# Patient Record
Sex: MALE | Race: AMERICAN INDIAN OR ALASKA NATIVE
[De-identification: names, ages, dates, MRNs, and addresses within clinical notes are randomized per-mention and may not be internally consistent; named-entity substitution may affect disease eponyms.]

---

## 2017-06-26 ENCOUNTER — HOSPITAL ENCOUNTER (EMERGENCY)
Dept: HOSPITAL 74 - JER | Age: 47
Discharge: HOME | End: 2017-06-26
Payer: COMMERCIAL

## 2017-06-26 VITALS — SYSTOLIC BLOOD PRESSURE: 127 MMHG | DIASTOLIC BLOOD PRESSURE: 73 MMHG | HEART RATE: 76 BPM | TEMPERATURE: 98.2 F

## 2017-06-26 VITALS — BODY MASS INDEX: 27.3 KG/M2
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
## 2017-06-26 DIAGNOSIS — M54.5: Primary | ICD-10-CM
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
## 2017-06-26 LAB
APPEARANCE UR: CLEAR
BILIRUB UR STRIP.AUTO-MCNC: NEGATIVE MG/DL
COLOR UR: YELLOW
KETONES UR QL STRIP: (no result)
LEUKOCYTE ESTERASE UR QL STRIP.AUTO: (no result)
MUCOUS THREADS URNS QL MICRO: (no result)
NITRITE UR QL STRIP: NEGATIVE
PH UR: 5 [PH] (ref 5–8)
PROT UR QL STRIP: NEGATIVE
PROT UR QL STRIP: NEGATIVE
RBC # BLD AUTO: 6 /HPF (ref 0–3)
RBC # UR STRIP: NEGATIVE /UL
SP GR UR: 1.02 (ref 1–1.02)
UROBILINOGEN UR STRIP-MCNC: NEGATIVE E.U./DL (ref 0.2–1)
WBC # UR AUTO: 3 /HPF (ref 3–5)

## 2017-06-26 PROCEDURE — 3E0233Z INTRODUCTION OF ANTI-INFLAMMATORY INTO MUSCLE, PERCUTANEOUS APPROACH: ICD-10-PCS | Performed by: EMERGENCY MEDICINE

## 2017-06-26 NOTE — PDOC
History of Present Illness





- General


History Source: Patient


Exam Limitations: No Limitations





- History of Present Illness


Initial Comments: 


06/26/17 14:49


The patient is a 46-year-old man with a significant past surgical history of 

hernia repair who presents to the emergency department for constant non-

radiating left back pain. The patient reports lifting an approximately 10 lb 

box that might've injured the left side of his back. Patient is unable to 

describe the nature of his pain but notes his pain is exacerbating to a 9/10 in 

severity with musculoskelatal manuvers. He took Aleve at home at approximately 

09:00 AM that did not provide any relief. Patient denies any numbness, tingling 

or weakness to his extremities. No neck pain. No unsteady gait. No bowel/

bladder incontinence. No chest pain, cough, shortness of breath, lightheadedness

, dizziness, nausea, vomiting, diarrhea. No urinary complaints. 





He denies chest pain, cough, shortness of breath, headache


He denies nausea, vomiting, diarrhea


He denies changes in bowel habits, dysuria, hematuria, urinary frequency/urgency

, testicular pain or penile discharge.





Allergies: No Known Drug Allergies


Past Surgical History: Hernia Repair.


Social History: Current everyday cigarette smoker. No EtOH and recreational 

drug use.


Primary Care Physician: Dr. Ruth Palencia








<Sharla Cavanaugh - Last Filed: 06/26/17 16:16>





<Prabha South - Last Filed: 06/26/17 16:38>





<Rhea Calzada - Last Filed: 06/26/17 17:14>





- General


Chief Complaint: Pain, Acute


Stated Complaint: LT SIDE/BACK PAIN (KIDNEY)


Time Seen by Provider: 06/26/17 14:49





Past History





<Sharla Cavanaugh - Last Filed: 06/26/17 16:16>





- Surgical History


Abdominal Surgery:  (HERNIA REPAIR)





- Immunization History


Td Vaccination: Yes


Immunization Up to Date: Yes





- Psycho/Social/Smoking Cessation Hx


Anxiety: No


Suicidal Ideation: No


Smoking Status: Yes


Smoking History: Current every day smoker


Years of Tobacco Use: 14


Have you smoked in the past 12 months: Yes


Number of Cigarettes Smoked Daily: 10


If you are a former smoker, when did you quit?: 3 DAYS AGO


Cigars Per Day: 0


Information on smoking cessation initiated: No


'Breaking Loose' booklet given: 07/13/14


Hx Alcohol Use: No


Drug/Substance Use Hx: No


Substance Use Type: None





<Prabha South - Last Filed: 06/26/17 16:38>





<ZhengRheabam Min - Last Filed: 06/26/17 17:14>





- Past Medical History


Allergies/Adverse Reactions: 


 Allergies











Allergy/AdvReac Type Severity Reaction Status Date / Time


 


No Known Allergies Allergy   Verified 06/26/17 11:41











Home Medications: 


Ambulatory Orders





NK [No Known Home Medication]  06/26/17 











**Review of Systems





- Review of Systems


Able to Perform ROS?: Yes


Comments:: 


06/26/17 14:49


GENERAL/CONSTITUTIONAL: No fever or chills. No weakness.


HEAD, EYES, EARS, NOSE AND THROAT: No change in vision. No ear pain or 

discharge. No sore throat.


CARDIOVASCULAR: No chest pain or shortness of breath.


RESPIRATORY: No cough, wheezing, or hemoptysis.


GASTROINTESTINAL: No nausea, vomiting, diarrhea or constipation.


GENITOURINARY: No dysuria, frequency, or change in urination.


MUSCULOSKELETAL: No joint or muscle swelling or pain. No neck or back pain.


SKIN: No rash


NEUROLOGIC: No headache, vertigo, loss of consciousness, or change in strength/

sensation.


ENDOCRINE: No increased thirst. No abnormal weight change.


HEMATOLOGIC/LYMPHATIC: No anemia, easy bleeding, or history of blood clots.


ALLERGIC/IMMUNOLOGIC: No hives or skin allergy.








<Sharla Cavanaugh - Last Filed: 06/26/17 16:16>





*Physical Exam





- Vital Signs


 Last Vital Signs











Temp Pulse Resp BP Pulse Ox


 


 98 F   77   18   116/75   99 


 


 06/26/17 11:38  06/26/17 11:38  06/26/17 11:38  06/26/17 11:38  06/26/17 11:38














- Physical Exam


Comments: 


06/26/17 14:49


GENERAL: Awake, alert, and fully oriented, in no acute distress 


HEAD: No signs of trauma


EYES: PERRLA, EOMI, sclera anicteric, conjunctiva clear


ENT: Auricles normal inspection, hearing grossly normal, nares patent, 

oropharynx clear without exudates. Moist mucosa


NECK: Normal ROM, supple, no lymphadenopathy, JVD, or masses


LUNGS: Breath sounds equal, clear to auscultation bilaterally.  No wheezes, and 

no crackles


HEART: Regular rate and rhythm, normal S1 and S2, no murmurs, rubs or gallops


ABDOMEN: Soft, nontender, normoactive bowel sounds.  No guarding, no rebound.  

No masses


EXTREMITIES: Normal range of motion, no edema.  No clubbing or cyanosis. No 

cords, erythema, or tenderness


BACK: +Right paraspinal tenderness. 


NEUROLOGICAL: Cranial nerves II through XII grossly intact.  Normal speech.








<Sharla Cavanaugh - Last Filed: 06/26/17 16:16>





- Vital Signs


 Last Vital Signs











Temp Pulse Resp BP Pulse Ox


 


 98 F   77   18   116/75   99 


 


 06/26/17 11:38  06/26/17 11:38  06/26/17 11:38  06/26/17 11:38  06/26/17 11:38














<Prabha South - Last Filed: 06/26/17 16:38>





- Vital Signs


 Last Vital Signs











Temp Pulse Resp BP Pulse Ox


 


 98.2 F   76   17   127/73   100 


 


 06/26/17 16:20  06/26/17 16:20  06/26/17 16:20  06/26/17 16:20  06/26/17 16:20














<Rhea Calzada - Last Filed: 06/26/17 17:14>





ED Treatment Course





- ADDITIONAL ORDERS


Additional order review: 


 Laboratory  Results











  06/26/17





  15:00


 


Urine Color  Yellow


 


Urine Appearance  Clear


 


Urine pH  5.0


 


Urine Protein  Negative


 


Urine Glucose (UA)  Negative


 


Urine Ketones  Trace H


 


Urine Blood  Negative


 


Urine Nitrite  Negative


 


Urine Bilirubin  Negative


 


Urine Urobilinogen  Negative


 


Ur Leukocyte Esterase  Trace H


 


Urine RBC  6


 


Urine WBC  3


 


Ur Epithelial Cells  Rare


 


Urine Mucus  Many














- Medications


Given in the ED: 


ED Medications














Discontinued Medications














Generic Name Dose Route Start Last Admin





  Trade Name Freq  PRN Reason Stop Dose Admin


 


Ketorolac Tromethamine  60 mg 06/26/17 15:29 06/26/17 15:45





  Toradol Injection -  IM 06/26/17 15:30  60 mg





  ONCE ONE   Administration














<Sharla Cavanaugh - Last Filed: 06/26/17 16:16>





- ADDITIONAL ORDERS


Additional order review: 


 Laboratory  Results











  06/26/17





  15:00


 


Urine Color  Yellow


 


Urine Appearance  Clear


 


Urine pH  5.0


 


Urine Protein  Negative


 


Urine Glucose (UA)  Negative


 


Urine Ketones  Trace H


 


Urine Blood  Negative


 


Urine Nitrite  Negative


 


Urine Bilirubin  Negative


 


Urine Urobilinogen  Negative


 


Ur Leukocyte Esterase  Trace H


 


Urine RBC  6


 


Urine WBC  3


 


Ur Epithelial Cells  Rare


 


Urine Mucus  Many














- Medications


Given in the ED: 


ED Medications














Discontinued Medications














Generic Name Dose Route Start Last Admin





  Trade Name Yobani  PRN Reason Stop Dose Admin


 


Ketorolac Tromethamine  60 mg 06/26/17 15:29 06/26/17 15:45





  Toradol Injection -  IM 06/26/17 15:30  60 mg





  ONCE ONE   Administration














<Rhea Calzada - Last Filed: 06/26/17 17:14>





Medical Decision Making





- Medical Decision Making





06/26/17 16:38


Pt presents to the ED complaining of the acute onset of left sided back pain 

that began while lifting a heavy box.  No urinary or neurologic complaints.  No 

trauma or fever.  Pain is made worse with twisting movements of the torso.  

Pain seems most consistent with muscular pain, but UA shows microscopic 

hematuria.  Will check CT abdomen pelvis to rule out renal stone. 





<Parbha South - Last Filed: 06/26/17 16:38>





*DC/Admit/Observation/Transfer





- Attestations


Scribe Attestion: 





06/26/17 15:57





Documentation prepared by Sharla Cavanaugh, acting as medical scribe for 

Prabha South MD, MD/DO.





<Sharla Cavanaugh - Last Filed: 06/26/17 16:16>





<Prabha South - Last Filed: 06/26/17 16:38>





<Rhea Calzada - Last Filed: 06/26/17 17:14>


Diagnosis at time of Disposition: 


Back pain


Qualifiers:


 Back pain location: low back pain Chronicity: acute Back pain laterality: 

right Sciatica presence: without sciatica Qualified Code(s): M54.5 - Low back 

pain





- Discharge Dispostion


Disposition: HOME


Condition at time of disposition: Stable





- Referrals


Referrals: 


Ruth Palencia MD [Primary Care Provider] - 





- Patient Instructions


Printed Discharge Instructions:  DI for Low Back Pain


Additional Instructions: 


please followup with your physician if your symptoms persist

## 2017-08-11 ENCOUNTER — HOSPITAL ENCOUNTER (EMERGENCY)
Dept: HOSPITAL 74 - FER | Age: 47
Discharge: HOME | End: 2017-08-11
Payer: COMMERCIAL

## 2017-08-11 VITALS — DIASTOLIC BLOOD PRESSURE: 79 MMHG | HEART RATE: 70 BPM | SYSTOLIC BLOOD PRESSURE: 125 MMHG | TEMPERATURE: 98 F

## 2017-08-11 VITALS — BODY MASS INDEX: 26.6 KG/M2

## 2017-08-11 DIAGNOSIS — F17.203: Primary | ICD-10-CM

## 2017-08-11 LAB
ALBUMIN SERPL-MCNC: 4.1 G/DL (ref 3.5–5)
ALP SERPL-CCNC: 41 U/L (ref 32–92)
ALT SERPL-CCNC: 23 U/L (ref 10–40)
ANION GAP SERPL CALC-SCNC: 7 MMOL/L (ref 8–16)
AST SERPL-CCNC: 26 U/L (ref 10–42)
BASOPHILS # BLD: 1 % (ref 0–2)
BILIRUB SERPL-MCNC: 0.3 MG/DL (ref 0.2–1)
CALCIUM SERPL-MCNC: 8.9 MG/DL (ref 8.4–10.2)
CK SERPL-CCNC: 163 IU/L (ref 39–308)
CO2 SERPL-SCNC: 26 MMOL/L (ref 22–28)
CREAT SERPL-MCNC: 0.8 MG/DL (ref 0.6–1.3)
DEPRECATED RDW RBC AUTO: 11.7 % (ref 11.9–15.9)
EOSINOPHIL # BLD: 2.3 % (ref 0–4.5)
GLUCOSE SERPL-MCNC: 93 MG/DL (ref 74–106)
MCH RBC QN AUTO: 31.1 PG (ref 25.7–33.7)
MCHC RBC AUTO-ENTMCNC: 36.2 G/DL (ref 32–35.9)
MCV RBC: 85.7 FL (ref 80–96)
NEUTROPHILS # BLD: 50.8 % (ref 42.8–82.8)
PLATELET # BLD AUTO: 122 K/MM3 (ref 134–434)
PMV BLD: 11.4 FL (ref 7.5–11.1)
PROT SERPL-MCNC: 6.6 G/DL (ref 6.4–8.3)
TROPONIN I SERPL-MCNC: < 0.03 NG/ML (ref 0.03–0.5)
WBC # BLD AUTO: 8.1 K/MM3 (ref 4–10.8)

## 2017-08-11 PROCEDURE — 3E023GC INTRODUCTION OF OTHER THERAPEUTIC SUBSTANCE INTO MUSCLE, PERCUTANEOUS APPROACH: ICD-10-PCS

## 2017-08-11 NOTE — EKG
Test Reason : 

Blood Pressure : ***/*** mmHG

Vent. Rate : 061 BPM     Atrial Rate : 061 BPM

   P-R Int : 170 ms          QRS Dur : 102 ms

    QT Int : 410 ms       P-R-T Axes : 046 077 011 degrees

   QTc Int : 412 ms

 

SINUS RHYTHM

WHEN COMPARED WITH ECG OF 27-JUL-2016 09:05,

NO SIGNIFICANT CHANGE WAS FOUND

Confirmed by BROWN MEDINA MD ([id number]) on 8/11/2017 1:20:05 PM

 

Referred By:  KINZA           Confirmed By:BROWN MEDINA MD

## 2017-08-11 NOTE — PDOC
History of Present Illness





- General


Chief Complaint: Chest Pain


Stated Complaint: CHEST,ABD PAIN


Time Seen by Provider: 08/11/17 12:33





- History of Present Illness


Initial Comments: 





08/11/17 14:01


Chief complaint: Chest and abdominal pain





History of present illness: Patient states that he quit smoking one week ago 

after smoking cigarettes 1-2 packs per day for 20 years. A few days thereafter 

he began to feel "heartburn" in his epigastrium radiating to his anterior 

chest. This is accompanied by intermittent nausea, anorexia, anxiety, 

sleeplessness, and tingling in his hands and feet.





Review of systems: Denies diaphoresis, lightheadedness, shortness of breath, 

visual or focal neurologic symptoms, unsteadiness of gait. Remainder systems 

reviewed and found to be negative





Past medical history: Patient is a healthy male, no significant past medical or 

surgical problems, no home medications.





Social history: Quit smoking at the urging of his small daughter. Denies 

alcohol or other drugs. Fully active and without disability





Family history: Reviewed and noncontributory including early coronary artery 

disease, metabolic disease including diabetes, and cancer





Physical exam: Alert and oriented 3, well-developed well-nourished, no acute 

distress, cheerful and cooperative


Afebrile, vital signs normal


PERRLA, fundi benign, ENT clear


Neck supple without bruit mass or nodes


Chest clear with full breath sounds throughout


CV S1 and S2 normal without murmur rub or gallop pulses full and symmetric no 

JVD or edema.


No rib cage or chest wall deformity or point tenderness


Abdomen soft nontender without mass or organomegaly. Bowel sounds normal.


Neurological C2 to 12 intact. No focal sensory or motor deficits. Gait stable 

and unimpaired


Extremities no CCE


Skin clear, no rash, adequate turgor and wet mucous membranes





Impression: Most likely withdrawal from nicotine with multiple somatic 

symptoms. No suggestion of neurologic or cardiac disease on exam or from the 

history





Plan: EKG and enzymes, CBC and chemistries, Protonix and Zofran, further 

medical evaluation depending on results





Past History





- Past Medical History


Allergies/Adverse Reactions: 


 Allergies











Allergy/AdvReac Type Severity Reaction Status Date / Time


 


No Known Allergies Allergy   Verified 08/11/17 12:09











Home Medications: 


Ambulatory Orders





Hydroxyzine Pamoate [Vistaril -] 25 mg PO TID #20 capsule 08/11/17 


Pantoprazole Sodium [Protonix -] 40 mg PO DAILY #10 tablet.ec 08/11/17 








Other medical history: LOWER BACK AND NECK PAIN





- Surgical History


Abdominal Surgery:  (HERNIA REPAIR)





- Immunization History


Td Vaccination: Yes


Immunization Up to Date: Yes





- Psycho/Social/Smoking Cessation Hx


Anxiety: No


Suicidal Ideation: No


Smoking Status: Yes


Smoking History: Former smoker


Years of Tobacco Use: 14


Have you smoked in the past 12 months: Yes


Number of Cigarettes Smoked Daily: 10


If you are a former smoker, when did you quit?: AUG 5TH 2017


Cigars Per Day: 0


Information on smoking cessation initiated: Yes


'Breaking Loose' booklet given: 08/11/17


Hx Alcohol Use: No


Drug/Substance Use Hx: No


Substance Use Type: None





*Physical Exam





- Vital Signs


 Last Vital Signs











Temp Pulse Resp BP Pulse Ox


 


 98 F   70   20   125/79   100 


 


 08/11/17 12:08  08/11/17 12:08  08/11/17 12:08  08/11/17 12:08  08/11/17 12:08














ED Treatment Course





- LABORATORY


CBC & Chemistry Diagram: 


 08/11/17 13:00





 08/11/17 13:00





- ADDITIONAL ORDERS


Additional order review: 


 Laboratory  Results











  08/11/17





  13:00


 


Sodium  136


 


Potassium  3.5


 


Chloride  103


 


Carbon Dioxide  26


 


Anion Gap  7 L


 


BUN  13


 


Creatinine  0.8


 


Creat Clearance w eGFR  > 60


 


Random Glucose  93


 


Calcium  8.9


 


Total Bilirubin  0.3


 


AST  26


 


ALT  23


 


Alkaline Phosphatase  41


 


Creatine Kinase  163


 


Creatine Kinase Index  1.7


 


CK-MB (CK-2)  2.9


 


Troponin I  < 0.03 L


 


Total Protein  6.6


 


Albumin  4.1








 











  08/11/17





  13:00


 


RBC  4.46


 


MCV  85.7


 


MCHC  36.2 H


 


RDW  11.7 L


 


MPV  11.4 H


 


Neutrophils %  50.8


 


Lymphocytes %  39.1


 


Monocytes %  6.8


 


Eosinophils %  2.3


 


Basophils %  1.0














- Medications


Given in the ED: 


ED Medications














Discontinued Medications














Generic Name Dose Route Start Last Admin





  Trade Name Freq  PRN Reason Stop Dose Admin


 


Pantoprazole Sodium 40 mg/  100 mls @ 200 mls/hr 08/11/17 12:57 08/11/17 13:03





  Sodium Chloride  IVPB 08/11/17 13:26  200 mls/hr





  ONCE ONE   Administration


 


Ondansetron HCl  4 mg 08/11/17 13:00 08/11/17 13:03





  Zofran Injection  IVPB 08/11/17 13:01  4 mg





  ONCE ONE   Administration














Medical Decision Making





- Medical Decision Making


08/11/17 12:46


EKG shows normal sinus rhythm rate 61/m. Normal axes and intervals. There is an 

inverted T-wave in lead 3 and the suggestion of flattening of the ST segment in 

aVF. These are the only abnormalities. Old EKGs were obtained from 2014 and 

2016. Similar changes were present, there being no interval change.





08/11/17 12:50








08/11/17 13:47


Laboratories reviewed: No significant abnormalities in the CBC and chemistries. 

Troponin is negative.





Patient symptoms are most likely due to abrupt cessation of smoking after 

smoking 1-2 packs per day for 20 years. He is already 1 week without cigarettes 

and is determined to continue his abstinence. Short course of Protonix and 

anxiolytic until body acclimated to the withdrawal of nicotine. Instructed to 

follow-up with his primary physician or return to the ER if his symptoms are 

worse. Fully ambulatory and in no significant pain or other distress upon 

discharge with friend to follow-up as directed





08/11/17 14:06








*DC/Admit/Observation/Transfer


Diagnosis at time of Disposition: 


 Nicotine withdrawal





- Discharge Dispostion


Disposition: HOME


Condition at time of disposition: Improved


Admit: No





- Prescriptions


Prescriptions: 


Pantoprazole Sodium [Protonix -] 40 mg PO DAILY #10 tablet.ec


Hydroxyzine Pamoate [Vistaril -] 25 mg PO TID #20 capsule





- Patient Instructions


Printed Discharge Instructions:  Smoking Cessation, Smoking Cessation 

Associated with Decreases Risk of Complications After Speedy





- Post Discharge Activity


Work/School Note:  Back to Work

## 2018-02-28 ENCOUNTER — HOSPITAL ENCOUNTER (EMERGENCY)
Dept: HOSPITAL 74 - JER | Age: 48
LOS: 1 days | Discharge: HOME | End: 2018-03-01
Payer: COMMERCIAL

## 2018-02-28 VITALS — DIASTOLIC BLOOD PRESSURE: 82 MMHG | SYSTOLIC BLOOD PRESSURE: 117 MMHG | TEMPERATURE: 97.9 F | HEART RATE: 71 BPM

## 2018-02-28 VITALS — BODY MASS INDEX: 26.9 KG/M2

## 2018-02-28 DIAGNOSIS — M79.602: ICD-10-CM

## 2018-02-28 DIAGNOSIS — M79.1: Primary | ICD-10-CM

## 2018-03-01 LAB
ALBUMIN SERPL-MCNC: 3.6 G/DL (ref 3.4–5)
ALP SERPL-CCNC: 49 U/L (ref 45–117)
ALT SERPL-CCNC: 18 U/L (ref 12–78)
ANION GAP SERPL CALC-SCNC: 12 MMOL/L (ref 8–16)
AST SERPL-CCNC: 17 U/L (ref 15–37)
BASOPHILS # BLD: 0.5 % (ref 0–2)
BILIRUB SERPL-MCNC: 0.3 MG/DL (ref 0.2–1)
BUN SERPL-MCNC: 20 MG/DL (ref 7–18)
CALCIUM SERPL-MCNC: 8.9 MG/DL (ref 8.5–10.1)
CHLORIDE SERPL-SCNC: 103 MMOL/L (ref 98–107)
CO2 SERPL-SCNC: 24 MMOL/L (ref 21–32)
CREAT SERPL-MCNC: 0.9 MG/DL (ref 0.7–1.3)
DEPRECATED RDW RBC AUTO: 12.9 % (ref 11.9–15.9)
EOSINOPHIL # BLD: 2.8 % (ref 0–4.5)
GLUCOSE SERPL-MCNC: 88 MG/DL (ref 74–106)
HCT VFR BLD CALC: 40.1 % (ref 35.4–49)
HGB BLD-MCNC: 13.7 GM/DL (ref 11.7–16.9)
LYMPHOCYTES # BLD: 48.9 % (ref 8–40)
MCH RBC QN AUTO: 30.9 PG (ref 25.7–33.7)
MCHC RBC AUTO-ENTMCNC: 34 G/DL (ref 32–35.9)
MCV RBC: 90.8 FL (ref 80–96)
MONOCYTES # BLD AUTO: 7.9 % (ref 3.8–10.2)
NEUTROPHILS # BLD: 39.9 % (ref 42.8–82.8)
PLATELET # BLD AUTO: 128 K/MM3 (ref 134–434)
PMV BLD: 12 FL (ref 7.5–11.1)
POTASSIUM SERPLBLD-SCNC: 3.6 MMOL/L (ref 3.5–5.1)
PROT SERPL-MCNC: 6.8 G/DL (ref 6.4–8.2)
RBC # BLD AUTO: 4.42 M/MM3 (ref 4–5.6)
SODIUM SERPL-SCNC: 139 MMOL/L (ref 136–145)
WBC # BLD AUTO: 8 K/MM3 (ref 4–10)

## 2018-03-01 NOTE — PDOC
History of Present Illness





- General


History Source: Patient, Family


Exam Limitations: No Limitations





- History of Present Illness


Initial Comments: 





03/01/18 01:26


The patient is a 47 year old male presenting with his family, with no 

significant past medical history, who presents to the emergency department with 

chest pain onset today and left arm numbness/tingling for the past 3 days. He 

describes his chest pain as sharp in nature and ranging from mild to moderate, 

with radiation to the left upper back and his left upper extremity. He reports 

taking Ibuprofen 800 mg and percocet, which has helped alleviate the pain. He 

notes that he has been worked up for chest pain in the past and had a stress 

test last year that was normal. He reports that he did receive the flu shot 

last week on his left arm. 





The patient denies shortness of breath, headache and dizziness. Denies fever, 

chills, nausea, vomit, diarrhea and constipation. Denies dysuria, frequency, 

urgency and hematuria. 





Allergies: None 


Past surgical history: Hernia Repair 


Social history: Half a pack daily. No alcohol or drug use reported 








<Cecilio Swift - Last Filed: 03/01/18 01:26>





- General


History Source: Patient


Exam Limitations: No Limitations





<Yael Ross - Last Filed: 03/01/18 02:08>





- General


Chief Complaint: Chest Pain


Stated Complaint: CHEST PAIN


Time Seen by Provider: 03/01/18 00:08





Past History





<Cecilio Swift - Last Filed: 03/01/18 01:26>





- Past Medical History


COPD: No


Other medical history: denies





- Surgical History


Abdominal Surgery:  (HERNIA REPAIR)





- Immunization History


Td Vaccination: Yes


Immunization Up to Date: Yes





- Suicide/Smoking/Psychosocial Hx


Smoking Status: Yes


Smoking History: Current every day smoker


Years of Tobacco Use: 14


Have you smoked in the past 12 months: Yes


Number of Cigarettes Smoked Daily: 10


If you are a former smoker, when did you quit?: AUG 5TH 2017


Cigars Per Day: 0


Information on smoking cessation initiated: No


'Breaking Loose' booklet given: 08/11/17


Hx Alcohol Use: No


Drug/Substance Use Hx: No


Substance Use Type: None





<Yael Ross - Last Filed: 03/01/18 02:08>





- Past Medical History


Allergies/Adverse Reactions: 


 Allergies











Allergy/AdvReac Type Severity Reaction Status Date / Time


 


No Known Allergies Allergy   Verified 08/11/17 12:09











Home Medications: 


Ambulatory Orders





Pantoprazole Sodium [Protonix -] 40 mg PO DAILY #10 tablet.ec 08/11/17 


hydrOXYzine PAMOATE [Vistaril -] 25 mg PO TID #20 capsule 08/11/17 











**Review of Systems





- Review of Systems


Able to Perform ROS?: Yes


Comments:: 





03/01/18 01:27


GENERAL/CONSTITUTIONAL: No fever or chills. No weakness.


HEAD, EYES, EARS, NOSE AND THROAT: No change in vision. No ear pain or 

discharge. No sore throat.


CARDIOVASCULAR: (+) Chest pain. No shortness of breath


RESPIRATORY: No cough, wheezing, or hemoptysis.


GASTROINTESTINAL: No nausea, vomiting, diarrhea or constipation.


GENITOURINARY: No dysuria, frequency, or change in urination.


MUSCULOSKELETAL: No joint or muscle swelling or pain. No neck or back pain.


SKIN: No rash


NEUROLOGIC: (+) Left upper extremity numbness/tingling. No headache, vertigo, 

loss of consciousness.


ENDOCRINE: No increased thirst. No abnormal weight change


HEMATOLOGIC/LYMPHATIC: No anemia, easy bleeding, or history of blood clots.


ALLERGIC/IMMUNOLOGIC: No hives or skin allergy.








<Cecilio Swift - Last Filed: 03/01/18 01:26>





*Physical Exam





- Vital Signs


 Last Vital Signs











Temp Pulse Resp BP Pulse Ox


 


 97.9 F   71   19   117/82   98 


 


 02/28/18 23:07  02/28/18 23:07  02/28/18 23:07  02/28/18 23:07  02/28/18 23:07














- Physical Exam


Comments: 





03/01/18 01:27


GENERAL: Awake, alert, and fully oriented, in no acute distress


HEAD: No signs of trauma, normocephalic, atraumatic 


EYES: PERRLA, EOMI, sclera anicteric, conjunctiva clear


ENT: Auricles normal inspection, hearing grossly normal, nares patent, 

oropharynx clear without


exudates. Moist mucosa


NECK: Normal ROM, supple, no lymphadenopathy, JVD, or masses


LUNGS: No distress, speaks full sentences, clear to auscultation bilaterally 


HEART: Regular rate and rhythm, normal S1 and S2, no murmurs, rubs or gallops, 

peripheral pulses normal and equal bilaterally. 


ABDOMEN: Soft, nontender, normoactive bowel sounds.  No guarding, no rebound.  

No masses


EXTREMITIES : Normal inspection, Normal range of motion, no edema.  No clubbing 

or cyanosis. 


NEUROLOGICAL: Cranial nerves II through XII grossly intact.  Normal speech, 

normal gait, no focal sensorimotor deficits 


SKIN: Warm, Dry, normal turgor, no rashes or lesions noted








<Cecilio Swift - Last Filed: 03/01/18 01:26>





- Vital Signs


 Last Vital Signs











Temp Pulse Resp BP Pulse Ox


 


 97.9 F   71   19   117/82   98 


 


 02/28/18 23:07  02/28/18 23:07  02/28/18 23:07  02/28/18 23:07  02/28/18 23:07














<Yael Ross - Last Filed: 03/01/18 02:08>





**Heart Score/ECG Review





- History


History: Slightly suspicious





- Electrocardiogram


EKG: Normal





- Age


Age: 45-65





- Risk Factors


Risk Factors Heart Score: Yes Smoking History, Yes Positive family hx of 

cardiac disease


Based on the list above the patient has:: 1-2 risk factors





- Troponin


Troponin: </= normal limit





- Score


Heart Score - Total: 2





<Yael Ross - Last Filed: 03/01/18 02:08>





ED Treatment Course





- LABORATORY


CBC & Chemistry Diagram: 


 03/01/18 00:49





 03/01/18 00:49





- ADDITIONAL ORDERS


Additional order review: 


 











  03/01/18





  00:49


 


RBC  4.42


 


MCV  90.8


 


MCHC  34.0


 


RDW  12.9


 


MPV  12.0 H D


 


Neutrophils %  39.9 L D


 


Lymphocytes %  48.9 H D


 


Monocytes %  7.9


 


Eosinophils %  2.8


 


Basophils %  0.5














<Cecilio Swift - Last Filed: 03/01/18 01:26>





- LABORATORY


CBC & Chemistry Diagram: 


 03/01/18 00:49





 03/01/18 00:49





- RADIOLOGY


Radiology Studies Ordered: 














 Category Date Time Status


 


 CHEST PA & LAT [RAD] Stat Radiology  03/01/18 00:10 Taken














<Yael Ross - Last Filed: 03/01/18 02:08>





Medical Decision Making





- Medical Decision Making





03/01/18 01:11


Mr Dos Santos presents to the ER today due to a complaint of left arm pain and 

chest pain


The patient states that he has no history of HTN, DM, HLD, CAD


He is s/p stress test 1 year ago as an outpatient


Pt is s/p influenza vaccination 5 days ago


He noted three days ago that he has had left arm cramping/pain


Pt states the pain was constant and progressed down the left arm


No direct trauma


Pt was playing cards this evening his arm pain became severe


It was so severe, he could not move his arm higher than his shoulder


His pain radiated to his anterior chest


No nausea, no diaphoresis


No radiation to the back


Prior to arrival in the ER, the patient took Motrin 800mg followed by a percocet


This has completely resolved his pain





On examination:


Pt has muscular pain on palpation of the left biceps muscle


No neck pain


No shoulder pain


No chest wall pain


RRR


CTA b/l








Pt and family are concerned about ACS


Will do labs, ekg


It appears that he is low risk - Heart score = 2 (tobacco use + family history)


Based on this, will do troponin Now 


If undetectable, will repeat troponin in 2 hours 





03/01/18 01:23


EKG: SR, rate of 71 bpm, Axis nml, intervals nml, no st elevations or 

depressions, t wave inversion III


03/01/18 01:33





 Laboratory Tests











  03/01/18





  00:49


 


WBC  8.0


 


Hgb  13.7


 


Hct  40.1


 


Plt Count  128 L











03/01/18 01:47


 Laboratory Tests











  03/01/18





  00:49


 


Sodium  139


 


Potassium  3.6


 


Chloride  103


 


Carbon Dioxide  24


 


BUN  20 H D


 


Creatinine  0.9


 


Random Glucose  88


 


Creatine Kinase  140


 


Troponin I  < 0.02











03/01/18 02:03


Pt remains chest pain free


CXR: read preliminarily by me is negative


Pt does not wish to stay in the hospital


He states he has to go to work at 6 am


I have explained that it would be best to stay for a repeat troponin at 3 am


Pt is unable to do this





PERC negative





Will discharge to home


Pt will need to follow up with his primary care physician


Return to the ER for any other concerns or complaints





clinical impression: musculoskeletal pain, initial presentation





<Yael Ross - Last Filed: 03/01/18 02:08>





*DC/Admit/Observation/Transfer





- Attestations


Scribe Attestion: 





03/01/18 01:28


Documentation prepared by Cecilio Swift, acting as medical scribe for 

Yael Ross MD





<Cecilio Swift - Last Filed: 03/01/18 01:26>





- Discharge Dispostion


Admit: No





<Yael Ross - Last Filed: 03/01/18 02:08>


Diagnosis at time of Disposition: 


Musculoskeletal arm pain


Qualifiers:


 Laterality: left Qualified Code(s): M79.602 - Pain in left arm








- Discharge Dispostion


Disposition: HOME


Condition at time of disposition: Stable





- Referrals


Referrals: 


Jimmy Palencia [Primary Care Provider] - 


Jimmy Espinoza MD [Staff Physician] - 





- Patient Instructions


Printed Discharge Instructions:  DI for Atypical Chest Pain, DI for 

Musculoskeletal Pain


Additional Instructions: 


Mr. Dos Santos





Thank you for coming in to the ER today


Please be sure to follow up with your primary care physician within 2-3 days


Return to the ER for any new symptoms


you can continue taking motrin as needed for pain








- Post Discharge Activity

## 2018-03-01 NOTE — EKG
Test Reason : 

Blood Pressure : ***/*** mmHG

Vent. Rate : 071 BPM     Atrial Rate : 071 BPM

   P-R Int : 172 ms          QRS Dur : 092 ms

    QT Int : 390 ms       P-R-T Axes : 058 084 032 degrees

   QTc Int : 423 ms

 

NORMAL SINUS RHYTHM

POSSIBLE LEFT ATRIAL ENLARGEMENT

BORDERLINE ECG

WHEN COMPARED WITH ECG OF 11-AUG-2017 12:27,

NO SIGNIFICANT CHANGE WAS FOUND

Confirmed by NEO HUFFMAN MD ([id number]) on 3/1/2018 2:37:34 PM

 

Referred By:             Confirmed By:NEO HUFFMAN MD

## 2023-03-28 ENCOUNTER — HOSPITAL ENCOUNTER (EMERGENCY)
Dept: HOSPITAL 74 - JERFT | Age: 53
Discharge: HOME | End: 2023-03-28
Payer: COMMERCIAL

## 2023-03-28 VITALS
SYSTOLIC BLOOD PRESSURE: 160 MMHG | HEART RATE: 72 BPM | DIASTOLIC BLOOD PRESSURE: 77 MMHG | TEMPERATURE: 98.6 F | RESPIRATION RATE: 18 BRPM

## 2023-03-28 VITALS — BODY MASS INDEX: 26.2 KG/M2

## 2023-03-28 DIAGNOSIS — H57.89: Primary | ICD-10-CM
